# Patient Record
Sex: MALE | Race: WHITE | NOT HISPANIC OR LATINO | URBAN - METROPOLITAN AREA
[De-identification: names, ages, dates, MRNs, and addresses within clinical notes are randomized per-mention and may not be internally consistent; named-entity substitution may affect disease eponyms.]

---

## 2025-04-07 ENCOUNTER — OFFICE VISIT (OUTPATIENT)
Dept: URGENT CARE | Age: 45
End: 2025-04-07
Payer: COMMERCIAL

## 2025-04-07 VITALS
RESPIRATION RATE: 20 BRPM | DIASTOLIC BLOOD PRESSURE: 108 MMHG | TEMPERATURE: 98.5 F | WEIGHT: 135 LBS | HEART RATE: 93 BPM | OXYGEN SATURATION: 95 % | SYSTOLIC BLOOD PRESSURE: 143 MMHG

## 2025-04-07 DIAGNOSIS — J02.0 STREP PHARYNGITIS: Primary | ICD-10-CM

## 2025-04-07 LAB
POC HUMAN RHINOVIRUS PCR: NEGATIVE
POC INFLUENZA A VIRUS PCR: NEGATIVE
POC INFLUENZA B VIRUS PCR: NEGATIVE
POC RESPIRATORY SYNCYTIAL VIRUS PCR: NEGATIVE
POC STREPTOCOCCUS PYOGENES (GROUP A STREP) PCR: POSITIVE

## 2025-04-07 RX ORDER — AMOXICILLIN 500 MG/1
500 CAPSULE ORAL EVERY 12 HOURS SCHEDULED
Qty: 20 CAPSULE | Refills: 0 | Status: SHIPPED | OUTPATIENT
Start: 2025-04-07 | End: 2025-04-17

## 2025-04-07 ASSESSMENT — ENCOUNTER SYMPTOMS
CARDIOVASCULAR NEGATIVE: 1
CHILLS: 0
VOMITING: 1
APPETITE CHANGE: 0
EYES NEGATIVE: 1
SINUS PRESSURE: 0
RHINORRHEA: 0
RESPIRATORY NEGATIVE: 1
DIAPHORESIS: 0
ACTIVITY CHANGE: 0
SORE THROAT: 1
FEVER: 1

## 2025-04-07 ASSESSMENT — PATIENT HEALTH QUESTIONNAIRE - PHQ9
1. LITTLE INTEREST OR PLEASURE IN DOING THINGS: NOT AT ALL
2. FEELING DOWN, DEPRESSED OR HOPELESS: NOT AT ALL
SUM OF ALL RESPONSES TO PHQ9 QUESTIONS 1 AND 2: 0

## 2025-04-07 NOTE — LETTER
April 7, 2025     Patient: Jeremias Brown   YOB: 1980   Date of Visit: 4/7/2025       To Whom It May Concern:    Jeremias Brown was seen in my clinic on 4/7/2025 at 6:25 pm. Please excuse Jeremias for his absence from work on this day to make the appointment.    If you have any questions or concerns, please don't hesitate to call.         Sincerely,         Fabian Hendrix PA-C        CC: No Recipients

## 2025-04-07 NOTE — PROGRESS NOTES
"Subjective   Patient ID: Jeremias Brown is a 44 y.o. male. They present today with a chief complaint of Sore Throat, Fever, and Vomiting.    History of Present Illness  44-year-old male comes in today with a chief complaint of sore throat, fever and vomiting for approximately 2 days.  He states that he \"has not felt well and has been sleeping a lot after taking NyQuil \".  He had a missed work yesterday and today and came in today to be checked as well as get a note for work.  He states that he has felt hot, but is not sure if he had a fever.  He denies chills, shortness of breath, chest pain, nausea/vomiting/diarrhea.      Sore Throat   Associated symptoms include vomiting. Pertinent negatives include no congestion or ear pain.   Fever   Associated symptoms include a sore throat and vomiting. Pertinent negatives include no congestion or ear pain.   Vomiting  Associated symptoms: fever and sore throat    Associated symptoms: no chills        Past Medical History  Allergies as of 04/07/2025    (Not on File)       (Not in a hospital admission)       No past medical history on file.    No past surgical history on file.     reports that he has an unknown smoking status. He has quit using smokeless tobacco. He reports current drug use. Drug: Marijuana.    Review of Systems  Review of Systems   Constitutional:  Positive for fever. Negative for activity change, appetite change, chills and diaphoresis.   HENT:  Positive for sore throat. Negative for congestion, dental problem, ear pain, rhinorrhea and sinus pressure.    Eyes: Negative.    Respiratory: Negative.     Cardiovascular: Negative.    Gastrointestinal:  Positive for vomiting.   Genitourinary: Negative.    All other systems reviewed and are negative.                                 Objective    Vitals:    04/07/25 1845 04/07/25 1846   BP: (!) 155/110 (!) 143/108   Pulse: 93    Resp: 20    Temp: 36.9 °C (98.5 °F)    SpO2: 95%    Weight: 61.2 kg (135 lb)      No LMP for " male patient.    Physical Exam  Vitals and nursing note reviewed.   Constitutional:       Appearance: Normal appearance.   HENT:      Head: Normocephalic and atraumatic.      Nose: Nose normal.      Mouth/Throat:      Pharynx: Uvula midline. Oropharyngeal exudate present. No pharyngeal swelling, posterior oropharyngeal erythema, uvula swelling or postnasal drip.   Cardiovascular:      Rate and Rhythm: Normal rate and regular rhythm.   Pulmonary:      Effort: Pulmonary effort is normal.      Breath sounds: Normal breath sounds.   Musculoskeletal:         General: Normal range of motion.      Cervical back: Normal range of motion and neck supple.   Neurological:      General: No focal deficit present.      Mental Status: He is alert and oriented to person, place, and time.         Procedures    Point of Care Test & Imaging Results from this visit  No results found for this visit on 04/07/25.   Imaging  No results found.    Cardiology, Vascular, and Other Imaging  No other imaging results found for the past 2 days      Diagnostic study results (if any) were reviewed by Fabian Hendrix PA-C.    Assessment/Plan   Allergies, medications, history, and pertinent labs/EKGs/Imaging reviewed by Fabian Hendrix PA-C.     Medical Decision Making  44-year-old male comes in today with a chief complaint of fever and sore throat.  Vitals were reviewed and I did notice that the blood pressure was elevated.  I did discuss that with him and advised him to follow-up with his primary care physician regarding a second blood pressure evaluation.  He stated that he has never had high blood pressure before.  He denies any neurologic symptoms and I advised him that this may simply be because he is sick or a one-time event and that is why he needs to follow-up with his primary care physician to make sure that it is not continuous.  He is to return to the urgent care or the emergency room if he develops any neurologic symptoms that may be  associated with his blood pressure.  He agreed and understood.  His spot fire testing was positive for strep.  A prescription for amoxicillin was called in.  Patient is stable for discharge and request to go home.  Discharge instructions were given and patient is to follow-up with his primary care physician in the next 7 to 10 days regarding his blood pressure.    Orders and Diagnoses  There are no diagnoses linked to this encounter.    Medical Admin Record      Patient disposition: Home    Electronically signed by Fabian Hendrix PA-C  7:03 PM